# Patient Record
Sex: FEMALE
[De-identification: names, ages, dates, MRNs, and addresses within clinical notes are randomized per-mention and may not be internally consistent; named-entity substitution may affect disease eponyms.]

---

## 2021-06-28 PROBLEM — Z00.00 ENCOUNTER FOR PREVENTIVE HEALTH EXAMINATION: Status: ACTIVE | Noted: 2021-06-28

## 2021-07-15 ENCOUNTER — APPOINTMENT (OUTPATIENT)
Dept: SURGERY | Facility: CLINIC | Age: 21
End: 2021-07-15
Payer: MEDICAID

## 2021-07-15 VITALS
DIASTOLIC BLOOD PRESSURE: 76 MMHG | WEIGHT: 241 LBS | HEIGHT: 65 IN | SYSTOLIC BLOOD PRESSURE: 108 MMHG | OXYGEN SATURATION: 98 % | BODY MASS INDEX: 40.15 KG/M2 | TEMPERATURE: 97.6 F | HEART RATE: 87 BPM

## 2021-07-15 PROCEDURE — 99202 OFFICE O/P NEW SF 15 MIN: CPT | Mod: 25

## 2021-07-15 PROCEDURE — 46600 DIAGNOSTIC ANOSCOPY SPX: CPT

## 2021-07-15 RX ORDER — HYDROCORTISONE 25 MG/G
2.5 CREAM TOPICAL 3 TIMES DAILY
Qty: 60 | Refills: 1 | Status: ACTIVE | COMMUNITY
Start: 2021-07-15 | End: 1900-01-01

## 2021-08-12 ENCOUNTER — APPOINTMENT (OUTPATIENT)
Dept: SURGERY | Facility: CLINIC | Age: 21
End: 2021-08-12
Payer: MEDICAID

## 2021-08-12 VITALS
HEIGHT: 65 IN | HEART RATE: 95 BPM | SYSTOLIC BLOOD PRESSURE: 115 MMHG | WEIGHT: 238 LBS | BODY MASS INDEX: 39.65 KG/M2 | DIASTOLIC BLOOD PRESSURE: 80 MMHG | TEMPERATURE: 97.5 F

## 2021-08-12 DIAGNOSIS — K64.9 UNSPECIFIED HEMORRHOIDS: ICD-10-CM

## 2021-08-12 PROCEDURE — 99212 OFFICE O/P EST SF 10 MIN: CPT

## 2021-08-25 NOTE — ASSESSMENT
[FreeTextEntry1] : Ms. GARCIA has a number of things going on she's got a thrombosed external hemorrhoid as well as a large prolapsing internal hemorrhoid associated with a large hypertrophied anal papilla. For now we'll start on a topical hydrocortisone cream as well as start her on fiber in stool softeners and no water. She'll return to see me in 2-3 weeks at which time we'll discuss her surgical options.

## 2021-08-25 NOTE — PROCEDURE
[FreeTextEntry1] : Anoscopy performed using a disposable anoscope.  The patient was place in the left lateral decubitus position. After LUKASZ was performed the anoscope was inserted and the distal rectum was evaluated along with the anal canal and anal margin.\par \par Findings:\par Examination of the perineum reveals an obvious right lateral from both the resolving thrombosed external hemorrhoid no active bleeding, it is currently as soft.\par \par LUKASZ, good tone, no palpable mass, mild bleeding with exam.\par \par Anoscopy, very large prolapsing internal hemorrhoid as well as a hypertrophied anal papilla, exam limited due to patient discomfort.

## 2021-08-25 NOTE — HISTORY OF PRESENT ILLNESS
[FreeTextEntry1] : This is a new patient visit from his EA I was complaint of hemorrhoids. Ms. DAMON states that the she started having pain and discomfort approximately 2 weeks ago. She stated the area was swollen. In addition she says that D. hemorrhoids popped out of the abdomen. Chief she states that she in addition she has had some mild bleeding. She states that the pain and discomfort as well as the swelling has gone down over the last week.

## 2021-09-13 PROBLEM — K64.9 HEMORRHOIDS: Status: ACTIVE | Noted: 2021-07-15

## 2021-09-13 NOTE — HISTORY OF PRESENT ILLNESS
[FreeTextEntry1] : This is a follow-up patient visit for Ms. Stevenson who is being treated for hemorrhoids at last visit she was started on a topical hydrocortisone cream.  She returns today and states that she is feeling greatly improved.  He denies any bleeding or drainage.

## 2021-09-13 NOTE — PHYSICAL EXAM
[None] : no anal fissures seen [Excoriation] : excoriations [Multiple Sinus Tracts] : no perianal sinus tracts [Fistula] : no fistulas [Wart] : no warts [Ulcer ___ cm] : no ulcers [Pilonidal Cyst] : no pilonidal cysts [Pilonidal Sinus] : no pilonidal sinus [Pilonidal Sinus Draining] : no pilonidal sinus drainage [Reduce Spontaneously] : a spontaneously reducible (grade II) [Tender, Swollen] : nontender, non-swollen [Thrombosed] : that was not thrombosed [Skin Tags] : residual hemorrhoidal skin tags were noted [Alert] : alert [Oriented to Person] : oriented to person [Oriented to Place] : oriented to place [Oriented to Time] : oriented to time [Anxious] : anxious [de-identified] : Healthy female, NAD [de-identified] : Full range of motion [de-identified] : No focal deficits.

## 2021-09-13 NOTE — ASSESSMENT
[FreeTextEntry1] : Ms. DAMON has has resolved her thrombosed external hemorrhoid.  She has a number of other things going on.  We briefly discussed the surgical options and Ms. DAMON is not interested in surgery at this time.  She will return to see me on an as-needed basis.